# Patient Record
Sex: MALE | Race: ASIAN | NOT HISPANIC OR LATINO | Employment: UNEMPLOYED | URBAN - METROPOLITAN AREA
[De-identification: names, ages, dates, MRNs, and addresses within clinical notes are randomized per-mention and may not be internally consistent; named-entity substitution may affect disease eponyms.]

---

## 2018-10-31 ENCOUNTER — LAB (OUTPATIENT)
Dept: OTHER | Facility: HOSPITAL | Age: 48
End: 2018-10-31

## 2018-10-31 ENCOUNTER — CONSULT (OUTPATIENT)
Dept: ONCOLOGY | Facility: CLINIC | Age: 48
End: 2018-10-31

## 2018-10-31 VITALS
SYSTOLIC BLOOD PRESSURE: 123 MMHG | HEIGHT: 66 IN | HEART RATE: 89 BPM | OXYGEN SATURATION: 96 % | RESPIRATION RATE: 16 BRPM | BODY MASS INDEX: 34.18 KG/M2 | DIASTOLIC BLOOD PRESSURE: 82 MMHG | TEMPERATURE: 98.5 F | WEIGHT: 212.7 LBS

## 2018-10-31 DIAGNOSIS — R89.9 ABNORMAL LABORATORY TEST: Primary | ICD-10-CM

## 2018-10-31 DIAGNOSIS — D51.9 ANEMIA DUE TO VITAMIN B12 DEFICIENCY, UNSPECIFIED B12 DEFICIENCY TYPE: ICD-10-CM

## 2018-10-31 DIAGNOSIS — D50.9 MICROCYTIC HYPOCHROMIC ANEMIA: Primary | ICD-10-CM

## 2018-10-31 LAB
BASOPHILS # BLD AUTO: 0.06 10*3/MM3 (ref 0–0.2)
BASOPHILS NFR BLD AUTO: 0.8 % (ref 0–1.5)
DEPRECATED RDW RBC AUTO: 33.5 FL (ref 37–54)
EOSINOPHIL # BLD AUTO: 0.53 10*3/MM3 (ref 0–0.7)
EOSINOPHIL NFR BLD AUTO: 6.8 % (ref 0.3–6.2)
ERYTHROCYTE [DISTWIDTH] IN BLOOD BY AUTOMATED COUNT: 18.3 % (ref 11.5–14.5)
HAPTOGLOB SERPL-MCNC: 110 MG/DL (ref 30–200)
HCT VFR BLD AUTO: 41.8 % (ref 40.4–52.2)
HGB BLD-MCNC: 13.3 G/DL (ref 13.7–17.6)
HGB RETIC QN: 22.4 PG (ref 32.7–38.6)
IMM GRANULOCYTES # BLD: 0.07 10*3/MM3 (ref 0–0.03)
IMM GRANULOCYTES NFR BLD: 0.9 % (ref 0–0.5)
IMM RETICS NFR: 28.1 % (ref 0.7–13.7)
LDH SERPL-CCNC: 172 U/L (ref 135–225)
LYMPHOCYTES # BLD AUTO: 2.64 10*3/MM3 (ref 0.9–4.8)
LYMPHOCYTES NFR BLD AUTO: 34.1 % (ref 19.6–45.3)
MCH RBC QN AUTO: 19.4 PG (ref 27–32.7)
MCHC RBC AUTO-ENTMCNC: 31.8 G/DL (ref 32.6–36.4)
MCV RBC AUTO: 61.1 FL (ref 79.8–96.2)
MONOCYTES # BLD AUTO: 0.37 10*3/MM3 (ref 0.2–1.2)
MONOCYTES NFR BLD AUTO: 4.8 % (ref 5–12)
NEUTROPHILS # BLD AUTO: 4.07 10*3/MM3 (ref 1.9–8.1)
NEUTROPHILS NFR BLD AUTO: 52.6 % (ref 42.7–76)
NRBC BLD MANUAL-RTO: 0.3 /100 WBC (ref 0–0)
OVALOCYTES BLD QL SMEAR: NORMAL
PLAT MORPH BLD: NORMAL
PLATELET # BLD AUTO: 268 10*3/MM3 (ref 140–500)
PMV BLD AUTO: 10.5 FL (ref 6–12)
RBC # BLD AUTO: 6.84 10*6/MM3 (ref 4.6–6)
RETICS/RBC NFR AUTO: 2.17 % (ref 0.5–1.5)
STOMATOCYTES BLD QL SMEAR: NORMAL
TARGETS BLD QL SMEAR: NORMAL
WBC MORPH BLD: NORMAL
WBC NRBC COR # BLD: 7.74 10*3/MM3 (ref 4.5–10.7)

## 2018-10-31 PROCEDURE — 85046 RETICYTE/HGB CONCENTRATE: CPT | Performed by: INTERNAL MEDICINE

## 2018-10-31 PROCEDURE — 36415 COLL VENOUS BLD VENIPUNCTURE: CPT

## 2018-10-31 PROCEDURE — 83615 LACTATE (LD) (LDH) ENZYME: CPT | Performed by: INTERNAL MEDICINE

## 2018-10-31 PROCEDURE — 85025 COMPLETE CBC W/AUTO DIFF WBC: CPT | Performed by: INTERNAL MEDICINE

## 2018-10-31 PROCEDURE — 99204 OFFICE O/P NEW MOD 45 MIN: CPT | Performed by: INTERNAL MEDICINE

## 2018-10-31 PROCEDURE — 85007 BL SMEAR W/DIFF WBC COUNT: CPT | Performed by: INTERNAL MEDICINE

## 2018-10-31 PROCEDURE — 83021 HEMOGLOBIN CHROMOTOGRAPHY: CPT | Performed by: INTERNAL MEDICINE

## 2018-10-31 PROCEDURE — 83010 ASSAY OF HAPTOGLOBIN QUANT: CPT | Performed by: INTERNAL MEDICINE

## 2018-10-31 PROCEDURE — 85660 RBC SICKLE CELL TEST: CPT | Performed by: INTERNAL MEDICINE

## 2018-10-31 RX ORDER — LANOLIN ALCOHOL/MO/W.PET/CERES
1000 CREAM (GRAM) TOPICAL DAILY
Start: 2018-10-31

## 2018-10-31 NOTE — PROGRESS NOTES
Subjective     REASON FOR CONSULTATION:  Provide an opinion on any further workup or treatment on:    Anemia                       REQUESTING PHYSICIAN: Elma Duran MD      RECORDS OBTAINED: Records of the patients history including those obtained from the referring provider were reviewed and summarized in detail.    HISTORY OF PRESENT ILLNESS:      Donovan Lugo is a 48 y.o. patient who was referred for evaluation of anemia.  He had blood workup through his primary care physician on 9/18/2018 that revealed a hemoglobin of 13.4.  RBC count was 6.7 million.  MCV was 63.8.  His CBC was repeated on 10/5/2018 and was found to have a hemoglobin of 14.0 with an MCV of 63.1.    Patient reports fatigue.  However, he is very active and works almost daily in construction.  He is not noticing blood in the stool or urine.  He has no prior history of iron deficiency.    Patient's family history is negative for anemia.  He has 2 daughters and they think that his older daughter was 21 years old is possibly anemic but she did not have blood testing done.         Past Medical History:   Diagnosis Date   • Anemia    • Stye, left 2014       No past surgical history on file.    Social History     Social History   • Marital status:      Spouse name: Amanda Cunningham   • Number of children: N/A   • Years of education: N/A     Occupational History   •  Unemployed     Social History Main Topics   • Smoking status: Current Every Day Smoker   • Smokeless tobacco: Never Used   • Alcohol use Yes   • Drug use: No   • Sexual activity: Defer     Other Topics Concern   • Not on file     Social History Narrative   • No narrative on file       Cancer-related family history is not on file.    MEDICATIONS:  No current outpatient prescriptions on file.     ALLERGIES:  No Known Allergies     REVIEW OF SYSTEMS:  Review of Systems   Constitutional: Positive for fatigue. Negative for chills, fever and unexpected weight change.   HENT: Negative for mouth  "sores, nosebleeds, sore throat and voice change.    Eyes: Negative for visual disturbance.   Respiratory: Negative for cough and shortness of breath.    Cardiovascular: Negative for chest pain and leg swelling.   Gastrointestinal: Negative for abdominal pain, blood in stool, constipation, diarrhea, nausea and vomiting.   Genitourinary: Negative for dysuria, frequency and hematuria.   Musculoskeletal: Negative for arthralgias, back pain and joint swelling.   Skin: Negative for rash.   Neurological: Negative for dizziness, numbness and headaches.   Hematological: Negative for adenopathy. Does not bruise/bleed easily.   Psychiatric/Behavioral: Negative for dysphoric mood. The patient is not nervous/anxious.          Objective   VITAL SIGNS:  Vitals:    10/31/18 1353   BP: 123/82   Pulse: 89   Resp: 16   Temp: 98.5 °F (36.9 °C)   TempSrc: Oral   SpO2: 96%   Weight: 96.5 kg (212 lb 11.2 oz)   Height: 168 cm (66.14\")  Comment: new pt   PainSc: 0-No pain       Wt Readings from Last 3 Encounters:   10/31/18 96.5 kg (212 lb 11.2 oz)       PHYSICAL EXAMINATION  GENERAL:  The patient appears in good general condition, not in acute distress.  SKIN: Warm and dry. No skin rashes, ecchymosis or petechiae.  HEAD:  Normocephalic.  EYES:  No Jaundice. No Pallor. Pupils equal. EOMI.  NECK:  Supple with Good ROM. No Thyromegaly. No Masses.  LYMPHATICS:  No cervical or supraclavicular lymphadenopathy.  CHEST: Normal respiratory effort. Lungs clear to auscultation.   CARDIAC:  Normal S1 & S2. No murmurs. No edema.  ABDOMEN:  Soft. No tenderness. No Hepatomegaly. No Splenomegaly. No masses.  EXTREMITIES:  No clubbing. No joint swelling in the hands. No Calf tenderness.  NEUROLOGICAL:  No Focal neurological deficits.         RESULT REVIEW:     Results from last 7 days  Lab Units 10/31/18  1342   WBC 10*3/mm3 7.74   NEUTROS ABS 10*3/mm3 4.07   HEMOGLOBIN g/dL 13.3*   HEMATOCRIT % 41.8   PLATELETS 10*3/mm3 268          Assessment/Plan   1.  " Microcytic hypochromic anemia.  He had blood workup done that showed no evidence of iron deficiency.  He has adequate iron stores.  The significant degree of microcytosis with MCV at 61 with a hemoglobin of 13.3 suggests hemoglobinopathy -like thalassemia or hemoglobin E.    2.   fatigue.  Patient reports fatigue in the morning.  Review of the labs done on 10/5/2018, vitamin B-12 was low normal at 355 this may indicate mild B12 deficiency.      PLAN:    1.  I will obtain hemoglobin electrophoresis, reticulocyte count, LDH and haptoglobin.  If the hemoglobin electrophoresis reveals normal pattern, we will ask him to come back to the office for gene test for alpha thalassemia.        2.  I asked the patient to start B12 1000 µg daily.    3.  I explained to the patient that hemoglobinopathies are genetic conditions that cannot be altered.  The goal of treatment is to improve other contributing factors to optimize the hemoglobin level.  At this point, he has adequate iron and folate level but will be started on B12 supplement as above.  We discussed the need for testing of the patient's daughters if he has hemoglobinopathy.    4.  I will see the patient in follow-up in 3 months with repeat CBC.          Arcelia Vo MD  10/31/18

## 2018-11-02 LAB
HGB A MFR BLD: 93.9 % (ref 96.4–98.8)
HGB A2 MFR BLD COLUMN CHROM: 4.9 % (ref 1.8–3.2)
HGB C MFR BLD: 0 %
HGB F MFR BLD: 1.2 % (ref 0–2)
HGB FRACT BLD-IMP: ABNORMAL
HGB S BLD QL SOLY: NEGATIVE
HGB S MFR BLD: 0 %